# Patient Record
Sex: FEMALE | Race: WHITE | Employment: UNEMPLOYED | URBAN - METROPOLITAN AREA
[De-identification: names, ages, dates, MRNs, and addresses within clinical notes are randomized per-mention and may not be internally consistent; named-entity substitution may affect disease eponyms.]

---

## 2017-08-25 PROBLEM — B35.3 TINEA PEDIS OF RIGHT FOOT: Status: ACTIVE | Noted: 2017-08-25

## 2017-08-25 PROBLEM — L29.9 ITCHING: Status: ACTIVE | Noted: 2017-08-25

## 2017-11-01 PROBLEM — M79.672 PAIN IN LEFT FOOT: Status: ACTIVE | Noted: 2017-11-01

## 2017-11-01 PROBLEM — R22.42 MASS OF LEFT LOWER LEG: Status: ACTIVE | Noted: 2017-11-01

## 2017-11-01 PROBLEM — M92.62 HAGLUND'S DEFORMITY OF LEFT HEEL: Status: ACTIVE | Noted: 2017-11-01

## 2023-06-22 ENCOUNTER — OFFICE VISIT (OUTPATIENT)
Dept: PODIATRY CLINIC | Facility: CLINIC | Age: 63
End: 2023-06-22

## 2023-06-22 DIAGNOSIS — M79.671 RIGHT FOOT PAIN: ICD-10-CM

## 2023-06-22 DIAGNOSIS — M92.61 ACQUIRED HAGLUND'S DEFORMITY, RIGHT: Primary | ICD-10-CM

## 2023-06-22 DIAGNOSIS — M76.61 TENDONITIS, ACHILLES, RIGHT: ICD-10-CM

## 2023-06-22 PROCEDURE — 99203 OFFICE O/P NEW LOW 30 MIN: CPT | Performed by: STUDENT IN AN ORGANIZED HEALTH CARE EDUCATION/TRAINING PROGRAM

## 2023-06-22 RX ORDER — METHYLPREDNISOLONE 4 MG/1
TABLET ORAL
Qty: 21 TABLET | Refills: 0 | Status: SHIPPED | OUTPATIENT
Start: 2023-06-22

## 2023-06-23 ENCOUNTER — TELEPHONE (OUTPATIENT)
Dept: PODIATRY CLINIC | Facility: CLINIC | Age: 63
End: 2023-06-23

## 2023-06-25 NOTE — PATIENT INSTRUCTIONS
Take Medrol Dosepak as prescribed. Complete physical therapy as prescribed. Use heel lifts as advised. Follow-up in 1 month for reevaluation or sooner if other concerns arise.

## 2024-12-06 ENCOUNTER — HOSPITAL ENCOUNTER (OUTPATIENT)
Dept: GENERAL RADIOLOGY | Age: 64
Discharge: HOME | End: 2024-12-06
Attending: PODIATRIST

## 2024-12-06 ENCOUNTER — OFFICE VISIT (OUTPATIENT)
Facility: LOCATION | Age: 64
End: 2024-12-06
Payer: MEDICARE

## 2024-12-06 DIAGNOSIS — M79.672 LEFT FOOT PAIN: ICD-10-CM

## 2024-12-06 DIAGNOSIS — M25.572 LEFT ANKLE PAIN, UNSPECIFIED CHRONICITY: ICD-10-CM

## 2024-12-06 DIAGNOSIS — Z98.890 HISTORY OF FOOT SURGERY: ICD-10-CM

## 2024-12-06 DIAGNOSIS — R20.2 PARESTHESIAS: ICD-10-CM

## 2024-12-06 DIAGNOSIS — M76.72 PERONEAL TENDINITIS, LEFT: Primary | ICD-10-CM

## 2024-12-06 PROCEDURE — 99215 OFFICE O/P EST HI 40 MIN: CPT | Performed by: PODIATRIST

## 2024-12-06 RX ORDER — FAMOTIDINE 20 MG/1
20 TABLET, FILM COATED ORAL 2 TIMES DAILY
COMMUNITY
Start: 2010-01-01

## 2024-12-06 RX ORDER — FOLIC ACID 1 MG/1
3 TABLET ORAL DAILY
COMMUNITY
Start: 2022-09-14

## 2024-12-06 RX ORDER — IBUPROFEN 200 MG
200 CAPSULE ORAL 2 TIMES DAILY
COMMUNITY

## 2024-12-06 RX ORDER — IBUPROFEN 600 MG/1
600 TABLET, FILM COATED ORAL 3 TIMES DAILY PRN
COMMUNITY
Start: 2024-07-31

## 2024-12-06 NOTE — PROGRESS NOTES
[facility]  Progress Note      Velia Marquez is a 64 year old female.   Chief Complaint   Patient presents with    Foot Pain     Previous patient of Dr Mckeon, LOV 6/22/23. Patient is here for left foot and ankle pain. She rates pain in the office 4/10, comes and goes. She denies any numbness or tingling. She had surgery in 2011, ankle.          HPI:     Patient is a pleasant 64-year-old female who is presenting to clinic today with concerns of left foot and ankle pain.  Patient states that she has pain on the outside of her left foot and ankle that is intermittent.  She notices the pain worsening the more she is on her feet.  She rates the pain 4/10.  Patient does share that it does not hurt all the time.  Denies recent injuries.  No other concerns.      Allergies: Acetaminophen-codeine, Codeine, Diclofenac, Neosporin original [neomycin-bacitracin-polymyxin], Bacitracin, Bacitracin-neomycin-polymyxin, and Meloxicam   Current Outpatient Medications   Medication Sig Dispense Refill    Multiple Vitamin (MULTIVITAMIN ADULT OR) Take 1 tablet by mouth As Directed.      folic acid 1 MG Oral Tab Take 3 tablets (3 mg total) by mouth daily.      Calcium Citrate 950 (200 Ca) MG Oral Tab Take 200 mg by mouth 2 (two) times daily.      famotidine 20 MG Oral Tab Take 1 tablet (20 mg total) by mouth 2 (two) times daily.      ibuprofen 600 MG Oral Tab Take 1 tablet (600 mg total) by mouth 3 (three) times daily as needed.      methotrexate 2.5 MG Oral Tab         Past Medical History:    Rheumatoid arthritis (HCC)    Stomach discomfort      No past surgical history on file.   No family history on file.   Social History     Socioeconomic History    Marital status: Single   Tobacco Use    Smoking status: Never    Smokeless tobacco: Never           REVIEW OF SYSTEMS:     10 point ROS completed and was negative unless stated in HPI.      EXAM:     Right lower extremity focused exam:  GENERAL: well developed, well  nourished, in no apparent distress  EXTREMITIES:  1. Integument: Skin appears moist, warm, and supple. There are no color changes. No open lesions. No macerations. No Hyperkeratotic lesions.   2. Vascular: Dorsalis pedis 2/4 and posterior tibial pulse 2/4, capillary refill normal.  3. Neurological: Gross sensation intact via light touch.  Subjective paresthesias present  4. Musculoskeletal: All muscle groups are graded 5/5 in the foot and ankle with no pain elicited.  Minimal discomfort with palpation along the course of the peroneal tendons distal to the lateral malleolus to insertion of peroneus brevis on the fifth metatarsal base.  No pain activated with eversion strength test    XR ANKLE WEIGHTBEARING (3 VIEWS), LEFT (CPT=73610)    Result Date: 12/6/2024  CONCLUSION:  Mild osteoarthritic changes are present. No acute fracture or other acute bony process.   LOCATION:  [facility]   Dictated by ([facility]): Devon Hernández MD on 12/06/2024 at 11:05 AM     Finalized by ([facility]): Devon Hernández MD on 12/06/2024 at 11:05 AM       XR FOOT WEIGHTBEARING (3 VIEWS), LEFT (CPT=73630)    Result Date: 12/6/2024  CONCLUSION:    Screw present of active bone.  Midfoot DJD.  Bunion, bone thickening 1st metatarsal head, but no hallux valgus.  Plantar calcaneal spur.  No pes planus.  No acute fractures seen. Continued clinical and x-ray follow-up advised.  LOCATION:  [facility]   Dictated by ([facility]): Devon Hernández MD on 12/06/2024 at 10:57 AM     Finalized by ([facility]): Devon Hernández MD on 12/06/2024 at 11:04 AM          ASSESSMENT AND PLAN:   Diagnoses and all orders for this visit:    Peroneal tendinitis, left    Left foot pain  -     XR FOOT WEIGHTBEARING (3 VIEWS), LEFT (CPT=73630); Future    Left ankle pain, unspecified chronicity  -     XR ANKLE WEIGHTBEARING (3 VIEWS), LEFT (CPT=73610); Future    History of foot surgery    Paresthesias        Plan:   -Patient was seen and evaluated today in clinic.  Chart history reviewed.  Left foot and  ankle radiographs are obtained today.  See above for impression.    Discussed patient's previous surgery with her as she had multiple questions.      Discussed findings, which are consistent with intermittent peroneal tendinitis of the left lower extremity.  This does appear to be intermittent and there is a fairly benign exam today.  Discussed conservative management and potential for formal PT.  Discussed possible oral steroids if patient is not diabetic, otherwise use of NSAIDS if no history of GERD or stomach upset.  Recommend cryotherapy/icing.  Discussed the importance of rest and the need for immobilization.  Recommend use of OTC and/or custom orthotics in the treatment and future prevention of tendinitis.  Supportive shoe gear recommendation was also given to patient today.  Custom orthotics are medically necessary to support and off-load the forces leading to the pathology and future prevention of further tendon tear and deformity.  Recommend use of compression stockings vs. ACE wrap to control edema/swelling.  Discussed potential need to order MRI if conservative management fails to resolve symptoms out of concern for underlying tear.  Discussed potential need for surgical intervention if conservative management fails to resolve symptoms.    Provided patient with at home stretching and strengthening program.    Will discuss possible formal physical therapy if pain does return as patient's exam was fairly benign today    -All of the patient's questions and concerns were addressed.  They indicated their understanding of these issues and agrees to the plan.    Time spent reviewing pertinent information from patient's chart, reviewing any pertinent imaging, obtaining history and physical exam, discussing and mutually agreeing on a treatment plan, and documenting encounter: 40 minutes    RTC as needed    Clayton Schwab DPM        12/6/2024    [facility]  [address] [place] [address]  [place] [address]   [email or web address]            [employer] speech recognition software was used to prepare this note.  Errors in word recognition may occur.  Please contact me with any questions/concerns with this note.